# Patient Record
Sex: MALE | Race: WHITE | NOT HISPANIC OR LATINO | Employment: FULL TIME | URBAN - METROPOLITAN AREA
[De-identification: names, ages, dates, MRNs, and addresses within clinical notes are randomized per-mention and may not be internally consistent; named-entity substitution may affect disease eponyms.]

---

## 2022-10-12 ENCOUNTER — OFFICE VISIT (OUTPATIENT)
Dept: FAMILY MEDICINE CLINIC | Facility: CLINIC | Age: 27
End: 2022-10-12

## 2022-10-12 VITALS
OXYGEN SATURATION: 98 % | HEART RATE: 71 BPM | RESPIRATION RATE: 14 BRPM | HEIGHT: 69 IN | SYSTOLIC BLOOD PRESSURE: 148 MMHG | DIASTOLIC BLOOD PRESSURE: 88 MMHG

## 2022-10-12 DIAGNOSIS — F41.1 GAD (GENERALIZED ANXIETY DISORDER): ICD-10-CM

## 2022-10-12 DIAGNOSIS — Z23 NEED FOR VACCINATION: ICD-10-CM

## 2022-10-12 DIAGNOSIS — Z00.00 PREVENTATIVE HEALTH CARE: Primary | ICD-10-CM

## 2022-10-12 DIAGNOSIS — Z13.228 SCREENING FOR METABOLIC DISORDER: ICD-10-CM

## 2022-10-12 DIAGNOSIS — Z72.52 HIGH RISK HOMOSEXUAL BEHAVIOR: ICD-10-CM

## 2022-10-12 DIAGNOSIS — Z13.220 SCREENING, LIPID: ICD-10-CM

## 2022-10-12 DIAGNOSIS — Z11.3 ROUTINE SCREENING FOR STI (SEXUALLY TRANSMITTED INFECTION): ICD-10-CM

## 2022-10-12 PROCEDURE — 99385 PREV VISIT NEW AGE 18-39: CPT | Performed by: NURSE PRACTITIONER

## 2022-10-12 PROCEDURE — 90611 SMALLPOX&MONKEYPOX VAC 0.5ML: CPT | Performed by: NURSE PRACTITIONER

## 2022-10-12 PROCEDURE — 90471 IMMUNIZATION ADMIN: CPT | Performed by: NURSE PRACTITIONER

## 2022-10-12 PROCEDURE — 90472 IMMUNIZATION ADMIN EACH ADD: CPT | Performed by: NURSE PRACTITIONER

## 2022-10-12 PROCEDURE — 90686 IIV4 VACC NO PRSV 0.5 ML IM: CPT | Performed by: NURSE PRACTITIONER

## 2022-10-12 PROCEDURE — 90714 TD VACC NO PRESV 7 YRS+ IM: CPT | Performed by: NURSE PRACTITIONER

## 2022-10-12 RX ORDER — BUSPIRONE HYDROCHLORIDE 10 MG/1
TABLET ORAL
COMMUNITY
Start: 2022-09-23 | End: 2022-10-12 | Stop reason: SDUPTHER

## 2022-10-12 RX ORDER — BUSPIRONE HYDROCHLORIDE 10 MG/1
10 TABLET ORAL 2 TIMES DAILY
Qty: 180 TABLET | Refills: 1 | Status: SHIPPED | OUTPATIENT
Start: 2022-10-12 | End: 2023-02-01 | Stop reason: SDUPTHER

## 2022-10-12 RX ORDER — EMTRICITABINE AND TENOFOVIR DISOPROXIL FUMARATE 200; 300 MG/1; MG/1
TABLET, FILM COATED ORAL
COMMUNITY
End: 2022-10-12 | Stop reason: SDUPTHER

## 2022-10-12 RX ORDER — EMTRICITABINE AND TENOFOVIR DISOPROXIL FUMARATE 200; 300 MG/1; MG/1
1 TABLET, FILM COATED ORAL DAILY
Qty: 30 TABLET | Refills: 3 | Status: SHIPPED | OUTPATIENT
Start: 2022-10-12 | End: 2022-11-11

## 2022-10-12 NOTE — PROGRESS NOTES
Subjective   Ramon Umana is a 27 y.o. male.     History of Present Illness   New patient here to establish care. New to this practice and provider. Pt is due annual exam, labs and updated vaccines.     Chronic VANDA and he is managed by buspirone 10 mg bid. Denies SI/HI. PHQ-9 Total Score: 0     Chronic high risk sexual behavior, on PREP truvada 200-300 daily. Wanting monkeypox Vaccine today. STI screen due.     The following portions of the patient's history were reviewed and updated as appropriate: allergies, current medications, past family history, past medical history, past social history, past surgical history and problem list.    Review of Systems   Constitutional: Negative for activity change, fatigue, fever and unexpected weight loss.   HENT: Negative for congestion.         Dental exam is due      Eyes: Negative for visual disturbance.        Contacts, eye exam is due    Respiratory: Negative for cough, shortness of breath and wheezing.    Cardiovascular: Negative for chest pain, palpitations and leg swelling.   Gastrointestinal: Negative for abdominal distention, blood in stool, constipation, diarrhea and GERD.   Endocrine: Negative for cold intolerance, heat intolerance, polydipsia, polyphagia and polyuria.   Genitourinary: Negative for discharge, dysuria, testicular pain and urinary incontinence.   Musculoskeletal: Negative for arthralgias.   Skin: Negative for skin lesions.   Allergic/Immunologic: Negative for environmental allergies.   Neurological: Negative for syncope, weakness and headache.   Hematological: Does not bruise/bleed easily.   Psychiatric/Behavioral: Negative for sleep disturbance and depressed mood. The patient is nervous/anxious.        Objective   Physical Exam  Vitals reviewed.   Constitutional:       Appearance: Normal appearance. He is normal weight.   HENT:      Head: Normocephalic.      Right Ear: Tympanic membrane normal.      Left Ear: Tympanic membrane normal.      Nose: Nose  normal.      Mouth/Throat:      Mouth: Mucous membranes are moist.   Eyes:      Pupils: Pupils are equal, round, and reactive to light.   Cardiovascular:      Rate and Rhythm: Normal rate and regular rhythm.      Pulses: Normal pulses.      Heart sounds: Normal heart sounds.   Pulmonary:      Effort: Pulmonary effort is normal.      Breath sounds: Normal breath sounds.   Abdominal:      General: Abdomen is flat. Bowel sounds are normal.      Palpations: Abdomen is soft.   Musculoskeletal:         General: Normal range of motion.      Cervical back: Normal range of motion.   Skin:     General: Skin is warm.   Neurological:      General: No focal deficit present.      Mental Status: He is alert.   Psychiatric:         Mood and Affect: Mood normal.         Vitals:    10/12/22 1333   BP: 148/88   Pulse: 71   Resp: 14   SpO2: 98%     There is no height or weight on file to calculate BMI.    Procedures    Assessment & Plan   Problems Addressed this Visit        Mental Health    VANDA (generalized anxiety disorder)    Relevant Medications    busPIRone (BUSPAR) 10 MG tablet   Other Visit Diagnoses     Preventative health care    -  Primary    Screening, lipid        Relevant Orders    Lipid Panel With / Chol / HDL Ratio    Screening for metabolic disorder        Relevant Orders    Comprehensive Metabolic Panel    High risk homosexual behavior        Routine screening for STI (sexually transmitted infection)        Relevant Orders    Chlamydia trachomatis, Neisseria gonorrhoeae, PCR - , Urine, Clean Catch    Ct / GC NELSON, Pharyngeal - Swab, Throat    CT / NG PCR, Rectal - Swab, Large Intestine, Rectum    Hepatitis Panel, Acute    HIV-1 / O / 2 Ag / Antibody 4th Generation    RPR, Rfx Qn RPR / Confirm TP    Need for vaccination        Relevant Orders    Td (adult) Vaccine Not Adsorbed (Completed)    FluLaval/Fluzone >6 mos (6148-2917) (Completed)    Smallpox/Monkeypox (JYNNEOS) (Completed)      Diagnoses       Codes Comments     Preventative health care    -  Primary ICD-10-CM: Z00.00  ICD-9-CM: V70.0     Screening, lipid     ICD-10-CM: Z13.220  ICD-9-CM: V77.91     Screening for metabolic disorder     ICD-10-CM: Z13.228  ICD-9-CM: V77.99     High risk homosexual behavior     ICD-10-CM: Z72.52  ICD-9-CM: V69.2     VANDA (generalized anxiety disorder)     ICD-10-CM: F41.1  ICD-9-CM: 300.02     Routine screening for STI (sexually transmitted infection)     ICD-10-CM: Z11.3  ICD-9-CM: V74.5     Need for vaccination     ICD-10-CM: Z23  ICD-9-CM: V05.9         Orders Placed This Encounter   Procedures   • Chlamydia trachomatis, Neisseria gonorrhoeae, PCR - , Urine, Clean Catch     Order Specific Question:   Release to patient     Answer:   Routine Release   • Ct / GC NELSON, Pharyngeal - Swab, Throat     Order Specific Question:   Release to patient     Answer:   Routine Release   • CT / NG PCR, Rectal - Swab, Large Intestine, Rectum     Order Specific Question:   Release to patient     Answer:   Routine Release   • Td (adult) Vaccine Not Adsorbed   • FluLaval/Fluzone >6 mos (5415-5482)   • Smallpox/Monkeypox (JYNNEOS)   • Comprehensive Metabolic Panel     Order Specific Question:   Release to patient     Answer:   Routine Release   • Lipid Panel With / Chol / HDL Ratio     Order Specific Question:   Release to patient     Answer:   Routine Release   • Hepatitis Panel, Acute     Order Specific Question:   Release to patient     Answer:   Routine Release   • HIV-1 / O / 2 Ag / Antibody 4th Generation     Order Specific Question:   Release to patient     Answer:   Routine Release   • RPR, Rfx Qn RPR / Confirm TP     Order Specific Question:   Release to patient     Answer:   Routine Release       Preventative care- Follow heart healthy diet, drink water, walk daily. Wear seatbelts, wear helmets, wear sunscreens. Follow CDC guidelines for covid pandemic.     High risk sexual behavior: PREP, rf truvada, check labs and STI panel, return for screen in 3  months, use condoms    Anxiety-refill buspirone 10 bid, check labs, enc heart healthy diet, exercise.        Education provided in AVS   Return in about 1 year (around 10/12/2023) for Annual.

## 2022-10-13 LAB
ALBUMIN SERPL-MCNC: 5.3 G/DL (ref 4.1–5.2)
ALBUMIN/GLOB SERPL: 2 {RATIO} (ref 1.2–2.2)
ALP SERPL-CCNC: 53 IU/L (ref 44–121)
ALT SERPL-CCNC: 13 IU/L (ref 0–44)
AST SERPL-CCNC: 19 IU/L (ref 0–40)
BILIRUB SERPL-MCNC: 0.6 MG/DL (ref 0–1.2)
BUN SERPL-MCNC: 16 MG/DL (ref 6–20)
BUN/CREAT SERPL: 14 (ref 9–20)
CALCIUM SERPL-MCNC: 9.8 MG/DL (ref 8.7–10.2)
CHLORIDE SERPL-SCNC: 102 MMOL/L (ref 96–106)
CHOLEST SERPL-MCNC: 137 MG/DL (ref 100–199)
CHOLEST/HDLC SERPL: 3.5 RATIO (ref 0–5)
CO2 SERPL-SCNC: 24 MMOL/L (ref 20–29)
CREAT SERPL-MCNC: 1.18 MG/DL (ref 0.76–1.27)
EGFRCR SERPLBLD CKD-EPI 2021: 86 ML/MIN/1.73
GLOBULIN SER CALC-MCNC: 2.7 G/DL (ref 1.5–4.5)
GLUCOSE SERPL-MCNC: 90 MG/DL (ref 70–99)
HAV IGM SERPL QL IA: NEGATIVE
HBV CORE IGM SERPL QL IA: NEGATIVE
HBV SURFACE AG SERPL QL IA: NEGATIVE
HCV AB S/CO SERPL IA: <0.1 S/CO RATIO (ref 0–0.9)
HCV AB SERPL QL IA: NORMAL
HDLC SERPL-MCNC: 39 MG/DL
HIV 1+2 AB+HIV1 P24 AG SERPL QL IA: NON REACTIVE
LDLC SERPL CALC-MCNC: 77 MG/DL (ref 0–99)
POTASSIUM SERPL-SCNC: 4.2 MMOL/L (ref 3.5–5.2)
PROT SERPL-MCNC: 8 G/DL (ref 6–8.5)
RPR SER QL: NON REACTIVE
SODIUM SERPL-SCNC: 140 MMOL/L (ref 134–144)
TRIGL SERPL-MCNC: 117 MG/DL (ref 0–149)
VLDLC SERPL CALC-MCNC: 21 MG/DL (ref 5–40)

## 2022-10-14 LAB
C TRACH DNA ANORECTL QL NAA+PROBE: NEGATIVE
C TRACH RRNA NPH QL NAA+PROBE: NEGATIVE
C TRACH RRNA SPEC QL NAA+PROBE: NEGATIVE
N GONORRHOEA RRNA ANORECTL QL NAA+PROBE: NEGATIVE
N GONORRHOEA RRNA NPH QL NAA+PROBE: NEGATIVE
N GONORRHOEA RRNA SPEC QL NAA+PROBE: NEGATIVE

## 2022-11-11 ENCOUNTER — CLINICAL SUPPORT (OUTPATIENT)
Dept: FAMILY MEDICINE CLINIC | Facility: CLINIC | Age: 27
End: 2022-11-11

## 2023-02-01 RX ORDER — BUSPIRONE HYDROCHLORIDE 10 MG/1
10 TABLET ORAL 2 TIMES DAILY
Qty: 180 TABLET | Refills: 1 | Status: SHIPPED | OUTPATIENT
Start: 2023-02-01 | End: 2023-04-05 | Stop reason: SDUPTHER

## 2023-02-13 RX ORDER — BUSPIRONE HYDROCHLORIDE 10 MG/1
TABLET ORAL
Qty: 240 TABLET | OUTPATIENT
Start: 2023-02-13

## 2023-04-05 DIAGNOSIS — F41.1 GAD (GENERALIZED ANXIETY DISORDER): Primary | ICD-10-CM

## 2023-04-05 RX ORDER — BUSPIRONE HYDROCHLORIDE 10 MG/1
10 TABLET ORAL 2 TIMES DAILY
Qty: 180 TABLET | Refills: 1 | Status: SHIPPED | OUTPATIENT
Start: 2023-04-05 | End: 2023-07-04

## 2023-04-05 NOTE — TELEPHONE ENCOUNTER
Caller: DongRamon    Relationship: Self    Best call back number:     Requested Prescriptions:   Requested Prescriptions     Pending Prescriptions Disp Refills   • busPIRone (BUSPAR) 10 MG tablet 180 tablet 1     Sig: Take 1 tablet by mouth 2 (Two) Times a Day for 90 days.        Pharmacy where request should be sent: [facility] [id number] [facility])  [phone] [facility] [phone] FX     Last office visit with prescribing clinician: 10/12/2022   Last telemedicine visit with prescribing clinician: Visit date not found   Next office visit with prescribing clinician: Visit date not found     Additional details provided by patient: PATIENT CALLING STATING THAT HE WILL BE GOING OVER SEAS FOR 9MONTHS HE WOULD LIKE TO KNOW IF HE COULD GET A LONG TERM SUPPLY WITH A WRITTEN PRESCRIPTION TO TAKE WITH HIM     Does the patient have less than a 3 day supply:  [] Yes  [x] No    Would you like a call back once the refill request has been completed: [x] Yes [] No    If the office needs to give you a call back, can they leave a voicemail: [x] Yes [] No    Tatum rTan Rep   04/05/23 14:10 EDT

## 2023-04-05 NOTE — TELEPHONE ENCOUNTER
Last office visit: 10/12/22    Next office visit: none scheduled    Last refill: 2/1/23      Do you mind filling while Cynthia is out? Pt requesting long term script due to going [place] a time

## 2023-11-17 ENCOUNTER — OFFICE VISIT (OUTPATIENT)
Dept: FAMILY MEDICINE CLINIC | Facility: CLINIC | Age: 28
End: 2023-11-17
Payer: COMMERCIAL

## 2023-11-17 VITALS
HEART RATE: 71 BPM | DIASTOLIC BLOOD PRESSURE: 92 MMHG | BODY MASS INDEX: 28.07 KG/M2 | HEIGHT: 69 IN | RESPIRATION RATE: 14 BRPM | OXYGEN SATURATION: 99 % | WEIGHT: 189.5 LBS | SYSTOLIC BLOOD PRESSURE: 140 MMHG

## 2023-11-17 DIAGNOSIS — Z23 NEED FOR VACCINATION: Primary | ICD-10-CM

## 2023-11-17 DIAGNOSIS — F41.1 GAD (GENERALIZED ANXIETY DISORDER): ICD-10-CM

## 2023-11-17 DIAGNOSIS — Z11.3 ROUTINE SCREENING FOR STI (SEXUALLY TRANSMITTED INFECTION): ICD-10-CM

## 2023-11-17 RX ORDER — BUSPIRONE HYDROCHLORIDE 10 MG/1
10 TABLET ORAL 3 TIMES DAILY
Qty: 180 TABLET | Refills: 1 | Status: SHIPPED | OUTPATIENT
Start: 2023-11-17

## 2023-11-17 RX ORDER — PROPRANOLOL HYDROCHLORIDE 10 MG/1
10 TABLET ORAL AS NEEDED
COMMUNITY
End: 2023-11-20 | Stop reason: SDUPTHER

## 2023-11-17 RX ORDER — EMTRICITABINE AND TENOFOVIR DISOPROXIL FUMARATE 200; 300 MG/1; MG/1
1 TABLET, FILM COATED ORAL DAILY
COMMUNITY

## 2023-11-17 NOTE — PROGRESS NOTES
Subjective   Ramon Umana is a 28 y.o. male.     History of Present Illness   Patient presents for medication refill for anxiety medication, ongoing. He has been taking buspar for about 3 years and does well. He takes propranolol PRN for flight anxiety. Patient denies any adverse side affects.    Patient was previously on truvada. Has been off for about 3 months. He denies any urinary frequency or penile discharge.     The following portions of the patient's history were reviewed and updated as appropriate: allergies, current medications, past family history, past medical history, past social history, past surgical history and problem list.    Review of Systems   Constitutional:  Negative for chills, fatigue and fever.   Respiratory:  Negative for cough, chest tightness, shortness of breath and wheezing.    Cardiovascular:  Negative for chest pain, palpitations and leg swelling.   Genitourinary:  Negative for discharge, frequency, hematuria and penile pain.   Neurological:  Negative for dizziness and headache.   Hematological: Negative.    Psychiatric/Behavioral:  Negative for sleep disturbance and suicidal ideas. The patient is nervous/anxious.        Objective   Physical Exam  Vitals and nursing note reviewed.   Constitutional:       Appearance: Normal appearance. He is well-developed and normal weight.   HENT:      Head: Normocephalic and atraumatic.   Eyes:      Conjunctiva/sclera: Conjunctivae normal.      Pupils: Pupils are equal, round, and reactive to light.   Cardiovascular:      Rate and Rhythm: Normal rate and regular rhythm.      Heart sounds: Normal heart sounds. No murmur heard.  Pulmonary:      Effort: Pulmonary effort is normal.      Breath sounds: Normal breath sounds.   Neurological:      Mental Status: He is alert and oriented to person, place, and time.   Psychiatric:         Behavior: Behavior normal.         Thought Content: Thought content normal.         Judgment: Judgment normal.          Vitals:    11/17/23 1454   BP: 140/92   Pulse: 71   Resp: 14   SpO2: 99%     Body mass index is 27.98 kg/m².      Procedures    Assessment & Plan   Problems Addressed this Visit          Mental Health    VANDA (generalized anxiety disorder)    Relevant Medications    busPIRone (BUSPAR) 10 MG tablet     Other Visit Diagnoses       Need for vaccination    -  Primary    Relevant Orders    Fluzone (or Fluarix & Flulaval for VFC) >6 Mos ([id number]) (Completed)    Routine screening for STI (sexually transmitted infection)        Relevant Orders    Chlamydia trachomatis, Neisseria gonorrhoeae, PCR - , Urine, Clean Catch    HIV-1 / O / 2 Ag / Antibody    RPR, Rfx Qn RPR / Confirm TP    Ct / GC NELSON, Pharyngeal - Swab, Oropharynx    CT / NG PCR, Rectal - Swab, Large Intestine, Rectum          Diagnoses         Codes Comments    Need for vaccination    -  Primary ICD-10-CM: Z23  ICD-9-CM: V05.9     Routine screening for STI (sexually transmitted infection)     ICD-10-CM: Z11.3  ICD-9-CM: V74.5     VANDA (generalized anxiety disorder)     ICD-10-CM: F41.1  ICD-9-CM: 300.02           STI panel  Refill of buspar  Will start Truvada once labs are back          Return in about 3 months (around 2/17/2024) for Labs, Recheck.

## 2023-11-18 LAB
HIV 1+2 AB+HIV1 P24 AG SERPL QL IA: NON REACTIVE
RPR SER QL: NON REACTIVE

## 2023-11-20 RX ORDER — PROPRANOLOL HYDROCHLORIDE 10 MG/1
10 TABLET ORAL AS NEEDED
Qty: 20 TABLET | Refills: 0 | Status: SHIPPED | OUTPATIENT
Start: 2023-11-20 | End: 2023-11-21 | Stop reason: SDUPTHER

## 2023-11-21 RX ORDER — PROPRANOLOL HYDROCHLORIDE 10 MG/1
10 TABLET ORAL AS NEEDED
Qty: 20 TABLET | Refills: 0 | Status: SHIPPED | OUTPATIENT
Start: 2023-11-21 | End: 2023-12-01

## 2024-01-14 DIAGNOSIS — F41.1 GAD (GENERALIZED ANXIETY DISORDER): ICD-10-CM

## 2024-01-15 RX ORDER — BUSPIRONE HYDROCHLORIDE 10 MG/1
10 TABLET ORAL 3 TIMES DAILY
Qty: 270 TABLET | Refills: 0 | Status: SHIPPED | OUTPATIENT
Start: 2024-01-15

## 2024-02-05 RX ORDER — PROPRANOLOL HYDROCHLORIDE 10 MG/1
TABLET ORAL
Qty: 20 TABLET | Refills: 0 | Status: SHIPPED | OUTPATIENT
Start: 2024-02-05

## 2024-07-01 RX ORDER — PROPRANOLOL HYDROCHLORIDE 10 MG/1
TABLET ORAL
Qty: 20 TABLET | Refills: 0 | Status: SHIPPED | OUTPATIENT
Start: 2024-07-01

## 2024-07-24 DIAGNOSIS — F41.1 GAD (GENERALIZED ANXIETY DISORDER): ICD-10-CM

## 2024-07-24 RX ORDER — BUSPIRONE HYDROCHLORIDE 10 MG/1
10 TABLET ORAL 3 TIMES DAILY
Qty: 270 TABLET | Refills: 0 | Status: SHIPPED | OUTPATIENT
Start: 2024-07-24

## 2024-10-18 DIAGNOSIS — F41.1 GAD (GENERALIZED ANXIETY DISORDER): ICD-10-CM

## 2024-10-18 RX ORDER — BUSPIRONE HYDROCHLORIDE 10 MG/1
10 TABLET ORAL 3 TIMES DAILY
Qty: 90 TABLET | Refills: 0 | Status: SHIPPED | OUTPATIENT
Start: 2024-10-18

## 2024-10-18 NOTE — TELEPHONE ENCOUNTER
LAST REFILL - 07/24/24  LAST VISIT - 10/12/22  NEXT VISIT - not scheduled    30 day supply pended, added to sig appt needed for further refills.

## 2025-01-23 DIAGNOSIS — F41.1 GAD (GENERALIZED ANXIETY DISORDER): ICD-10-CM

## 2025-01-24 RX ORDER — BUSPIRONE HYDROCHLORIDE 10 MG/1
TABLET ORAL
Qty: 90 TABLET | Refills: 0 | OUTPATIENT
Start: 2025-01-24